# Patient Record
Sex: MALE | Race: WHITE | Employment: FULL TIME | URBAN - METROPOLITAN AREA
[De-identification: names, ages, dates, MRNs, and addresses within clinical notes are randomized per-mention and may not be internally consistent; named-entity substitution may affect disease eponyms.]

---

## 2020-08-26 ENCOUNTER — OFFICE VISIT (OUTPATIENT)
Dept: PRIMARY CARE CLINIC | Age: 45
End: 2020-08-26
Payer: COMMERCIAL

## 2020-08-26 VITALS
OXYGEN SATURATION: 98 % | DIASTOLIC BLOOD PRESSURE: 78 MMHG | SYSTOLIC BLOOD PRESSURE: 130 MMHG | HEART RATE: 78 BPM | TEMPERATURE: 97 F

## 2020-08-26 DIAGNOSIS — Z20.822 EXPOSURE TO 2019 NOVEL CORONAVIRUS: Primary | ICD-10-CM

## 2020-08-26 PROCEDURE — 99213 OFFICE O/P EST LOW 20 MIN: CPT | Performed by: NURSE PRACTITIONER

## 2020-08-26 NOTE — PROGRESS NOTES
Delvin Munoz is a 39 y.o. male who was seen in clinic today (8/26/2020) for an acute visit. Assessment/Plan:     There is a low to med probability that this is COVID-19. * COVID-19 sample collected and submitted       * Patient given detailed CDC instructions contained within After Visit Summary    Diagnoses and all orders for this visit:    1. Exposure to 2019 novel coronavirus  -     NOVEL CORONAVIRUS (COVID-19)       known covid exposure. Discussed expected course/resolution/complications of diagnosis in detail with patient. Advised pt on CDC guidance, OTC medications for symptom management, red flags reviewed and should develop to seek emergency medical attn. Encouraged pt to maintain health through a balanced diet, high in fiber and plants;small freq meals if any nausea; staying well hydrated by drinking water or occ low sugar non carbonated beverages; reviewed importance of proper sleep habitus, 7-8hrs of rest; and emphasized moderate exercise 30 mins a day/150mins a week. Reviewed with him that COVID-19 pandemic is an evolving situation with rapidly changing recommendations & guidelines, continue to practice hand hygiene, social distancing, wearing of facial coverings. Regardless of testing results, should still follow CDC guidelines as there is a chance of a false negative, such as a poor sample collection or being too soon to test after exposure. Medical decisions are made based on the the best information available at the time. Recommended he stay tuned for updates published by trusted sources and to advise your PCP of any unexpected changes in clinical condition     Subjective:   Alli Mooney was seen today for Concern For COVID-19 (Coronavirus)    Exposure occurred approx all last week and ongoing due to babysitting grandkids and their father who tested positive covid 19 results. He denies a recent history/current: cough, fever, wheezing, SOB, and KING. occ cigar use. Travel Screening     Question   Response    In the last month, have you been in contact with someone who was confirmed or suspected to have Coronavirus / COVID-19? Yes    Do you have any of the following symptoms? None of these    Have you traveled internationally in the last month? No      Travel History   Travel since 07/26/20     No documented travel since 07/26/20          ROS - Pertinent items are noted in HPI    There is no problem list on file for this patient. Home Medications    Not on File      No Known Allergies       Objective:   Physical Exam  General:  Overweight, alert, cooperative, no distress   Ears: Normal external ear canals AU   Sinuses: Normal paranasal sinuses without tenderness   Mouth:  Lips, mucosa, and tongue normal. Teeth and gums normal: oropharynx: clear   Neck: supple, symmetrical, trachea midline. Heart: normal rate, regular rhythm, normal S1, S2, no murmurs, rubs, clicks or gallops. Lungs: clear to auscultation bilaterally       Visit Vitals  /78 (BP 1 Location: Left arm, BP Patient Position: Sitting)   Pulse 78   Temp 97 °F (36.1 °C)   SpO2 98%         Disclaimer:        Medication risks/benefits/costs/interactions/alternatives discussed with patient. He was given an after visit summary which includes diagnoses, current medications, & vitals. He expressed understanding with the diagnosis and plan. Aspects of this note may have been generated using voice recognition software. Despite editing, there may be some syntax errors.        Maribell Webb NP

## 2020-08-28 ENCOUNTER — TELEPHONE (OUTPATIENT)
Dept: PRIMARY CARE CLINIC | Age: 45
End: 2020-08-28

## 2020-08-28 LAB — SARS-COV-2, NAA: NOT DETECTED
